# Patient Record
Sex: MALE | Employment: UNEMPLOYED | URBAN - METROPOLITAN AREA
[De-identification: names, ages, dates, MRNs, and addresses within clinical notes are randomized per-mention and may not be internally consistent; named-entity substitution may affect disease eponyms.]

---

## 2024-03-05 ENCOUNTER — TELEPHONE (OUTPATIENT)
Dept: PHARMACY | Facility: CLINIC | Age: 49
End: 2024-03-05

## 2024-03-05 NOTE — TELEPHONE ENCOUNTER
[facility] CLINICAL PHARMACY: ADHERENCE REVIEW  Identified care gap per [facility]: fills at [facility] : Diabetes adherence    ASSESSMENT    DIABETES ADHERENCE    Insurance Records claims through  24  (Prior Year PDC = not reported; YTD PDC = FIRST FILL; Potential Fail Date: 24):   METFORMIN TAB 1000 MG  last filled on 01.10.24 for 30 day supply. Next refill due: 24    Prescribed si tablet/capsule twice daily    Per Insurer Portal: last filled on 01.10.24 for 30 day supply.     Per [facility]: last picked up on 01.10.24 for 30 day supply. will get 30 day supply ready to  since past due. Billed through Leslie. 0 refills remaining.    No results found for: \"LABA1C\"      PLAN    Per insurer report, LIS-2 - may be able to receive up to a 100-day supply for the same cost as a 30-day supply    The following are interventions that have been identified:   Patient overdue refilling METFORMIN TAB 1000 MG. Unsure if patient is still prescribed this medication.   [facility] refilled METFORMIN TAB 1000 MG for a 30 day supply.  Please ask pt who his PCP is.     Attempting to reach patient to review - unable to leave message. Letter sent to patient.    Last Visit: none  Next Visit: none    Silvia Marcum CPhT  Population Health    [facility]   [phone] option 2     For Pharmacy Admin Tracking Only    Program: [facility] [employer]  CPA in place:  No  Recommendation Provided To: Pharmacy: 1  Intervention Detail: Adherence Monitorin  Intervention Accepted By: Pharmacy: 1  Gap Closed?: No   Time Spent (min): 30

## 2024-05-20 ENCOUNTER — TELEPHONE (OUTPATIENT)
Dept: PHARMACY | Facility: CLINIC | Age: 49
End: 2024-05-20

## 2024-05-20 NOTE — TELEPHONE ENCOUNTER
[facility] CLINICAL PHARMACY: ADHERENCE REVIEW  Identified care gap per [employer] fills with [facility]: Diabetes adherence    Medicare and long term Dual Special Needs Plan - MRDSNP  Per insurer report, LIS-2 - may be able to receive up to a 100-day supply for the same cost as a 30-day supply.  Patient also appears to be prescribed: Statin    ASSESSMENT    DIABETES ADHERENCE    Insurance Records claims through  05.10.24  (Prior Year PDC = not reported; YTD PDC = 64%; Potential Fail Date: 24):   METFORMIN TAB 1000 MG last filled on 24 for 30 day supply. Next refill due: 24    Prescribed si tablet/capsule twice daily    Per Insurer Portal: last filled on 24 for 30 day supply.     Per [facility]: Billed through [employer]. 1 refills remaining. will get 30 day supply ready to  since past due.    No results found for: \"LABA1C\"    STATIN ADHERENCE    Insurance Records claims through  05.10.24  (Prior Year PDC = not reported; YTD PDC = 100%; Potential Fail Date: 10.30.24):   ATORVASTATIN TAB 40 MG last filled on 24 for 90 day supply. Next refill due: 24    Prescribed si tablet/capsule daily    Per Insurer Portal: last filled on 24 for 90 day supply.     Per [facility]: Billed through [employer]. last filled on 24 for 90 day supply and READY to . 0 refills remaining. Will be returned to stock tomorrow, 24.    No results found for: \"CHOL\", \"TRIG\", \"HDL\", \"LDLDIRECT\"  No results found for: \"LDL\", \"LDLDIRECT\"   No results found for: \"ALT\", \"AST\"  The ASCVD Risk score (Feli DOWNING, et al., 2019) failed to calculate for the following reasons:    The systolic blood pressure is missing    Cannot find a previous HDL lab    Cannot find a previous total cholesterol lab    The smoking status is invalid    Unable to determine if patient is Non-      PLAN  The following are interventions that have been identified:   Patient